# Patient Record
(demographics unavailable — no encounter records)

---

## 2024-10-30 NOTE — REVIEW OF SYSTEMS
[Fever] : no fever [Chills] : no chills [Skin Lesions] : no skin lesions [Skin Wound] : no skin wound [Breast Pain] : no breast pain [Breast Lump] : no breast lump

## 2024-10-30 NOTE — PHYSICAL EXAM
[NI] : Normal [de-identified] : Large, pendulous breasts, left>right, no masses, lumps, or nipple discharge, grade 3 ptosis SN-N: L 41.5 R 40.5 N-IMF: L 19 R 18 BW 17

## 2024-10-30 NOTE — HISTORY OF PRESENT ILLNESS
[FreeTextEntry1] : Ms. Miguel is a 48yoF with a long history of symptomatic macromastia and breast hypertrophy.  Patient reports experiencing back pain, neck pain, and shoulder grooving for many years due to her large breasts.  She has tried multiple conservative, non-surgical therapies including support bras, physical therapy, exercise, and medications (NSAIDs) with minimal relief in symptoms.  Her current bra size is 40H.  Her desired bra size is as small as possible.  She would like to have breast reduction surgery to reduce the size to be proportional to her body.  She denies breast discharge, breast lumps or masses. No personal history of breast cancer.  Her last screening mammogram was 10/2022.  She had a diagnostic mammogram 8/2023, but only of the right breast.    PMH Hypothyroid  Crohns disease, but managed without medication  PSH Denies  Medications Levothyroxine   Nonsmoker

## 2024-10-30 NOTE — ASSESSMENT
[FreeTextEntry1] : Ms. Miguel is a 48yoF with symptomatic macromastia.  She is a good candidate for bilateral breast reduction surgery.  This type of surgery is a medically necessary procedure that would relieve the symptoms of breast hypertrophy and quality of life.    We estimate removal from each breast is a least 690 grams to reduce her to her desired size.    I discussed with Ms. Miguel the details of breast reduction surgery including the incisional patterns of a Wise pattern incision.  We discussed the risks and benefits of surgery at length and all questions were answered.  She understands the scar pattern and that the scars are permanent as we reviewed.  I explained that breast reduction surgery can be performed safely under general anesthesia.  We discussed that typically this is a 2-4 hour outpatient procedure with a 2-6 week recovery.  I discussed with her the risks including but not limited to: bleeding, infection, hematoma, seroma, abnormal scarring, difficulty with wound healing, breast asymmetry, nipple hypersensitivity, nipple numbness, nipple areolar complex loss, change in skin sensation, possible skin numbness, chronic pain in the area, inability to breast feed, need for further surgery, keloid formation, and poor cosmetic outcome.  She understands that bra size varies with different bra manufacturers.  The size discussed is used as a tool of communication.  She understands that her breasts size can change with weight change or in some cases pregnancy.    Given her long sternal notch to nipple and nipple to inframammary fold length, we discussed that she may require a free nipple graft.  We discussed that the need for a free nipple graft will be decided intra-operatively.  She understands that if a free nipple graft is needed, she will not have sensation in the nipple areolar complex.  She also understands that she is at higher risk for nipple areolar complex loss.    I also discussed that surgery requires preauthorization from insurance carriers and that the office would submit necessary paperwork.  She understands all this and wishes to proceed with that plan.  All questions were answered.  Patient also requires a mammogram prior to surgery - she will call the office once the mammogram is done and we will schedule for surgery.    Clinical photographs were taken today for documentation.

## 2024-11-22 NOTE — HISTORY OF PRESENT ILLNESS
[FreeTextEntry1] : Patient with Mirena 2019 No issues  [Patient reported mammogram was normal] : Patient reported mammogram was normal [Patient reported PAP Smear was normal] : Patient reported PAP Smear was normal [Mammogramdate] : 2024 [PapSmeardate] : 2023 [TextBox_43] : has regularly secondary to family history

## 2024-11-25 NOTE — ADDENDUM
[FreeTextEntry1] : I, Kenny Alarcon, acted as a scribe on behalf of Dr. Christopher Lemos MD, on 11/25/2024.   All medical entries made by the scribe were at my, Dr. Christopher Lemos MD, direction and personally dictated by me on 11/25/2024. I have reviewed the chart and agree that the record accurately reflects my personal performance of the history, physical exam, assessment and plan. I have also personally directed, reviewed, and agreed with the chart.

## 2024-11-25 NOTE — HISTORY OF PRESENT ILLNESS
[No Pertinent Cardiac History] : no history of aortic stenosis, atrial fibrillation, coronary artery disease, recent myocardial infarction, or implantable device/pacemaker [No Pertinent Pulmonary History] : no history of asthma, COPD, sleep apnea, or smoking [No Adverse Anesthesia Reaction] : no adverse anesthesia reaction in self or family member [Chronic Anticoagulation] : no chronic anticoagulation [Chronic Kidney Disease] : no chronic kidney disease [Diabetes] : no diabetes [Good (7-10 METs)] : Good (7-10 METs) [FreeTextEntry1] : Breast Reduction [FreeTextEntry2] : 12/06/24 [FreeTextEntry3] : Dr. Mccauley [FreeTextEntry4] : Patient is a 48yr old female who is present today for medical clearance.   Pt reports feeling well overall. Denies CP and chest tightness. Denies using baby aspirin. Denies Hx of sleep apnea. Denies coughing and wheezing. Denies taking any herbal supplements. Reports taking Vitamin D. Pt reports no known allergies to anesthesia, Hx of complications, or Hx of difficult to control bleeding. Pt made aware to stop NSAIDs and omega-3s one week prior to procedure. Pt reports able to walk up two flights of steps without SOB.

## 2024-11-25 NOTE — ASSESSMENT
[Patient Optimized for Surgery] : Patient optimized for surgery [FreeTextEntry4] : Medical clearance visit:  -EKG Stable -BP is stable. Continue current management. -Order APTT, CBC, CMP, Prothrombin Time and INR Patient presents for longitudinal care   - RTO in 3 months  Pt verbalized understanding and will reach should any questions/concerns occur.

## 2024-12-11 NOTE — HISTORY OF PRESENT ILLNESS
[FreeTextEntry1] : Ms. Miguel is a 48yoF who is status post bilateral breast reduction surgery on 12/6.  She reports doing well, with no issues or complaints at todays visit.  She denies fevers, chills, N/V.  She says her pain has been well controlled.  ENEIDA drains with minimal 10-15cc ss output.

## 2024-12-11 NOTE — ASSESSMENT
[FreeTextEntry1] : Ms. Miguel is a 48yoF who is status post bilateral breast reduction surgery on 12/6.  She reports doing well, with no issues or complaints at todays visit.  Incisions are healing well with some residual ecchymosis and swelling, no signs of infection though.  ENEIDA drains removed at todays visit.  Continue to keep incisions clean and dry.  No heavy lifting or strenuous exercise.  Steri strips and prineo will fall off on their own.  Follow up in 3 weeks.  All questions were answered.

## 2024-12-11 NOTE — PHYSICAL EXAM
[NI] : Normal [de-identified] : Bilateral breasts s/p bilateral breast reduction, incisions healing well, still with some ecchymosis and swelling of the bilateral breasts, no erythema, drainage or signs of infection.  JPs with SS output.  Nipple areolar complexes viable and sensate.

## 2024-12-30 NOTE — PHYSICAL EXAM
[NI] : Normal [de-identified] : Bilateral breasts s/p bilateral breast reduction, incisions healing well, still with some swelling of the bilateral breasts.  She has some erythema along the length of both IMF incisions, no drainage, odor, or signs of infection.    Nipple areolar complexes viable and sensate.

## 2024-12-30 NOTE — ASSESSMENT
[FreeTextEntry1] : Ms. Miguel is a 48yoF who is status post bilateral breast reduction surgery on 12/6.  She reports doing well and is happy with her results.  She reports some redness and itchiness along the bilateral IMF incisions, likely a hypersensitivity reaction to the prineo.  No signs of infection at this time.  Prineo and steri strips removed today.  Incisions are well healed otherwise.  Continue to keep incisions clean and dry.  No strenuous exercise or heavy lifting at this time. Will follow up in 1 week to ensure hypersensitivity reaction is improving.  All questions were answered.

## 2024-12-30 NOTE — HISTORY OF PRESENT ILLNESS
[FreeTextEntry1] : Ms. Miguel is a 48yoF who is status post bilateral breast reduction surgery on 12/6.  She reports doing well, she just reports some itchiness and redness around the bottom incision, but is otherwise happy with her results.  She denies fevers, chills, N/V, or drainage.

## 2025-01-08 NOTE — HISTORY OF PRESENT ILLNESS
[FreeTextEntry1] : Ms. Miguel is a 48yoF who is status post bilateral breast reduction surgery on 12/6.  She reports doing well, and she is happy with her results.  She reports hypersensitivity reaction to the prineo/dermabond has resolved since removing the prineo last week.  She denies fevers, chills, N/V, or drainage.

## 2025-01-08 NOTE — PHYSICAL EXAM
[NI] : Normal [de-identified] : Bilateral breasts s/p bilateral breast reduction, incisions well healed and erythema along IMF resolved. No collections or signs of infection.  Nipple areolar complexes viable and sensate.

## 2025-01-08 NOTE — ASSESSMENT
[FreeTextEntry1] : Ms. Miguel is a 48yoF who is status post bilateral breast reduction surgery on 12/6.  She reports doing well and is happy with her results. Hypersensitivity reaction to prineo resolved since removing it last week.  All incisions well healed.  Continue to keep incisions clean and dry.  Okay to resume normal activity.  At 6 weeks, start scar massage daily and okay for scar creams at that time if desired.  Follow up in 2 months.  All questions were answered.

## 2025-01-14 NOTE — PHYSICAL EXAM
[Alert] : alert [Well Nourished] : well nourished [Healthy Appearance] : healthy appearance [Obese] : obese [No Acute Distress] : no acute distress [Well Developed] : well developed [Normal Voice/Communication] : normal voice communication [Normal Sclera/Conjunctiva] : normal sclera/conjunctiva [No Proptosis] : no proptosis [No Neck Mass] : no neck mass was observed [No LAD] : no lymphadenopathy [Supple] : the neck was supple [Thyroid Not Enlarged] : the thyroid was not enlarged [No Thyroid Nodules] : no palpable thyroid nodules [No Respiratory Distress] : no respiratory distress [Normal Rate] : heart rate was normal [No Edema] : no peripheral edema [Normal Affect] : the affect was normal [Normal Insight/Judgement] : insight and judgment were intact [Normal Mood] : the mood was normal [Acanthosis Nigricans] : no acanthosis nigricans

## 2025-01-14 NOTE — ASSESSMENT
[FreeTextEntry1] : This is a 48-year-old female with primary hypothyroidism, Crohns disease, vitamin B12 deficiency, vitamin D insufficiency, here for follow-up. 1. Hypothyroidism She is currently on levothyroxine 112 mcg daily.  Check TFTs. She is clinically euthyroid.  Thyroid ultrasound showed no thyroid nodules. 2. Vitamin B deficiency  Check vitamin B12. 3. Vitamin D insufficiency Check 25 vitamin D. Discussed diet.

## 2025-01-14 NOTE — ADDENDUM
[FreeTextEntry1] : This note was written by Clinton Turk on 01/14/2025 actively solely NANY Silver M.D.     All medical record entries made by the Scribe were at my, NANY Silver M.D. direction and personally dictated by me on 01/14/2025. I have personally reviewed the chart and agree that the record reflects my personal performance of the history, physical exam, assessment, and plan.

## 2025-01-14 NOTE — HISTORY OF PRESENT ILLNESS
[FreeTextEntry1] : CC: Hypothyroidism This is a 48-year-old female with primary hypothyroidism, Hashimoto's thyroiditis, Crohns disease, vitamin B12 deficiency, vitamin D insufficiency, here for follow-up.  She was diagnosed with hypothyroidism approximately age 30. She is currently levothyroxine 112 mcg daily. She takes levothyroxine in the morning on an empty stomach. There is a family history of thyroid cancer in her brother diagnosed in his 30s. Thyroid ultrasound showed diffusely heterogeneous thyroid gland, no thyroid nodules. She recently resumed vitamin D and vitamin b12 supplements, she paused on taking them due to breast reduction surgery.  Reports irregular menstrual periods.  She is on phentermine however reports minimal weight loss.

## 2025-02-25 NOTE — ASSESSMENT
[FreeTextEntry1] :  Rash, neck Favor eczematous dermatitis - favoring contact dermatitis  New diagnosis,  uncertain clinical course trial TAC 0.1% ointment BID. discussed frequent use of topical steroids when skin flaring and moisturizers when no longer active. if no improvement in 3-4 weeks/rapid recurrence when stopping topical steroids, recommend re-evaluation. - Side effects of long term use of topical steroids discussed with patient including but not limited to thinning of the skin, atrophy and dyspigmentation. - consider patch testing if not resolving  Xerosis - Principles of dry skin care reviewed including: gentle soaps, importance of using an emollient 1-2x/day, avoiding fragranced products including soaps and detergent

## 2025-02-25 NOTE — HISTORY OF PRESENT ILLNESS
Update: (This section must be completed if the H&P was completed greater than 24 hrs to procedure or admission)    H&P reviewed. After examining the patient, I find no changed to the H&P since it had been written. Patient re-evaluated.  Accept as history and physical.    Hu Hernandes MD/November 10, 2023/12:59 PM [FreeTextEntry1] : followup [de-identified] : rash around neck recently began no new products/jewelry

## 2025-03-03 NOTE — PHYSICAL EXAM

## 2025-03-03 NOTE — PHYSICAL EXAM
[NI] : Normal [de-identified] : Bilateral breasts s/p bilateral breast reduction, incisions well healed with good post op symmetry and shape. No collections or signs of infection.  Nipple areolar complexes viable and sensate.

## 2025-03-03 NOTE — HISTORY OF PRESENT ILLNESS
[FreeTextEntry1] : Ms. Miguel is a 48yoF who is status post bilateral breast reduction surgery on 12/6.  She reports doing well, and she is happy with her results with no issues or complaints at todays visit.  She denies fevers, chills, N/V, or drainage.

## 2025-03-03 NOTE — ASSESSMENT
[FreeTextEntry1] : This is a 48-year-old female with history of iron deficiency anemia from terminal ileal Crohn's disease.  She is not on any medications.  Last colonoscopy from May 2023 unremarkable without active disease.  I recommend repeating colonoscopy for surveillance.  I recommend blood work today including CBC, iron studies, CRP.

## 2025-03-03 NOTE — HISTORY OF PRESENT ILLNESS
[FreeTextEntry1] : Lisa Elder for follow-up visit.  She states to feel well.  She denies abdominal pain, nausea or vomiting.  She denies diarrhea.  On average she has 1-2 bowel movements daily.  No rectal bleeding.  No weight loss.  Last colonoscopy May 2023 with normal terminal ileum and normal colonoscopy.  She will need surveillance colonoscopy.

## 2025-03-03 NOTE — ASSESSMENT
[FreeTextEntry1] : Ms. Miguel is a 48yoF who is status post bilateral breast reduction surgery on 12/6.  She reports doing well and is happy with her results.  Breasts are well healed with good post op shape and symmetry.  Continue with normal activity, no restrictions at this time. Okay to continue scar massage.  Follow up in 3 months.  All questions were answered.

## 2025-03-17 NOTE — HISTORY OF PRESENT ILLNESS
[FreeTextEntry1] : Reason for visit. Ms. KYLE STEPHENS is being seen for a capsule procedure study

## 2025-03-17 NOTE — ASSESSMENT
[FreeTextEntry1] : Reason for visit. Ms. KYLE STEPHENS is being seen for a capsule procedure study     Procedure note:   This is a pleasant 48-year-old female being seen for a procedure visit for small bowel capsule endoscopy to evaluate for anemia, small bowel Crohn's disease.   Risks, benefits, and alternatives of small bowel capsule endoscopy reviewed with the patient in detail. Risks include missed lesions, as well as capsule retention that could possibly result in bowel obstruction and necessitate surgical removal. Informed written consent was obtained prior to ingestion of the pill cam. The patient ingested the small bowel capsule without difficulty. She tolerated the procedure well without immediate complications. Real time video shows capsule in the stomach. The patient was instructed to contact the office with any questions or concerns.   Patient informed that if she requires an MRI within 1 month that he will need an abdominal x-ray to confirm capsule exit.   Post capsule ingestion instructions were provided to the patient   Trista Capsule Lot: [id number] SN: [id number]

## 2025-04-11 NOTE — HISTORY OF PRESENT ILLNESS
[FreeTextEntry1] : B/L CTS  This is a very pleasant 48-year-old female presented to me for bilateral paresthesias to the radial 4 digits for the past year.  She has been wearing splints during the day however has not had much relief.  Paresthesias used to be intermittent however they are now constant.  Has not tried any conservative management.  Denies any neck pain.  Works at [employer] well in [place].

## 2025-04-11 NOTE — PHYSICAL EXAM
[de-identified] :  B/L hands Positive Tinel's at the carpal tunnel Positive flexion compression test Thenar atrophy present Negative Wartenberg and Froment's sign No hypothenar atrophy APB strength 3 out of 5

## 2025-04-11 NOTE — DISCUSSION/SUMMARY
[FreeTextEntry1] : 48-year-old female bilateral carpal tunnel symptoms status post bilateral diagnostic injections today and we discussed nighttime splinting follow-up in 12 weeks.   Carpal Tunnel Syndrome: The anatomy and physiology of carpal tunnel syndrome was discussed with the patient today.  Increase pressure localized under the transverse carpal ligament can cause pain, numbness, tingling, or dysesthesias within the median nerve distribution as well as feelings of fatigue, clumsiness, or awkwardness.  These symptoms typically occur at night and worse in the morning upon waking.  Eventually, untreated carpal tunnel syndrome can result in weakness and permanent loss of muscle within the thenar compartment of the hand.  Treatment options were discussed with the patient.  Conservative treatment includes nocturnal resting splints to keep the nerve in a neutral position, ergonomic changes within the work or home environment, activity modification, and tendon gliding exercises.  Steroid injections within the carpal canal can help a majority of patients, however this is often self-limited in a majority of patients.  Surgical intervention to divide the transverse carpal ligament typically results in a long-lasting relief of the patient's complaints, with the recurrence rate of less than 1%.  I have spent 45 minutes of time on the encounter which excludes teaching and/or separately reported services

## 2025-06-02 NOTE — ASSESSMENT
[FreeTextEntry1] : Ms. Miguel is a 48yoF who is status post bilateral breast reduction surgery on 12/6.  She reports doing well and is happy with her results.  Breasts are well healed with good post op shape and symmetry.  Continue with normal activity, no restrictions at this time. Okay to continue scar massage.  Follow up prn.  All questions were answered.

## 2025-06-20 NOTE — HEALTH RISK ASSESSMENT
[Good] : ~his/her~  mood as  good [No] : In the past 12 months have you used drugs other than those required for medical reasons? No [0] : 2) Feeling down, depressed, or hopeless: Not at all (0) [PHQ-2 Negative - No further assessment needed] : PHQ-2 Negative - No further assessment needed [Time Spent: ___ Minutes] : I spent [unfilled] minutes performing a depression screening for this patient. [Patient reported mammogram was normal] : Patient reported mammogram was normal [Patient reported colonoscopy was normal] : Patient reported colonoscopy was normal [Never] : Never [NO] : No [FreeTextEntry1] : health maintenance  [[id number]] : 0 [MammogramDate] : 11/24 [MammogramComments] : BIRADS 1 - Negative  [PapSmearComments] :  n/a  [BoneDensityComments] :  n/a  [ColonoscopyDate] : 06/25

## 2025-06-20 NOTE — ADDENDUM
[FreeTextEntry1] :  I, Britt Coleman, acted as a scribe on behalf of Dr. Christopher Lemos MD, on 06/20/2025.  All medical entries made by the scribe were at my, Dr. Christopher Lemos MD, direction and personally dictated by me on 06/20/2025. I have reviewed the chart and agree that the record accurately reflects my personal performance of the history, physical exam, assessment and plan. I have also personally directed, reviewed, and agreed with the chart.

## 2025-06-20 NOTE — HISTORY OF PRESENT ILLNESS
[FreeTextEntry1] : Patient is present today to establish care and for a comprehensive Annual Physical Exam.  [de-identified] : Patient is a 48yr old F who is present today to establish care and for a comprehensive Annual Physical Exam.  Patient is doing well overall. Is been losing weight limiting calories requesting refill for phentermine.   Denies any CP, chest tightness or SOB. Denies any abdominal pain, urinary symptom, or change in bowel habits. Denies any fever, chills, or night sweats. -Has seen GI, had a colonoscopy repeat in 2 years

## 2025-06-20 NOTE — ASSESSMENT
[Vaccines Reviewed] : Immunizations reviewed today. Please see immunization details in the vaccine log within the immunization flowsheet.  [FreeTextEntry1] : Annual Physical Exam: Obesity (BMI 30-39.9) - BP is stable. Continue current management. - Check A1c, CBC, CMP, Lipid profile, TSH, Urinalysis, Vitamin levels - Renew Phentermine HCl (30mg)   - RTO annually or as needed.   Pt verbalized understanding and will reach out should any questions/concerns occur.

## 2025-07-11 NOTE — HISTORY OF PRESENT ILLNESS
[FreeTextEntry1] : B/L CTS  This is a very pleasant 48-year-old female presented to me for bilateral paresthesia's to the radial 4 digits for the past year. She has been wearing splints during the day however has not had much relief. Paresthesia's used to be intermittent however they are now constant. Has not tried any conservative management. Denies any neck pain. Works at [employer] in [place].  Interval Hx 7/11/25: Patient arrives for f/u following b/s carpal tunnel CSI. She reports relief for about 3 months, however the pain has come back. She wants to discuss other options of treatment. She reports decreased numbness and tingling.

## 2025-07-11 NOTE — DISCUSSION/SUMMARY
[FreeTextEntry1] : 48-year-old female bilateral carpal tunnel symptoms status post bilateral diagnostic injections with symptomatic relief after the injections however symptoms are now returning.  We discussed carpal tunnel release on the right side as this hand has more progressive symptoms.  We discussed risks and benefits and she would like to move forward with surgery soon as possible.  I have spent 25 minutes of time on the encounter which excludes teaching and/or separately reported services

## 2025-07-11 NOTE — PHYSICAL EXAM
[de-identified] : B/L hands Positive Tinel's at the carpal tunnel Positive flexion compression test Thenar atrophy present Negative Wartenberg and Froment's sign No hypothenar atrophy APB strength 3 out of 5